# Patient Record
Sex: MALE | Race: BLACK OR AFRICAN AMERICAN | NOT HISPANIC OR LATINO | Employment: FULL TIME | URBAN - METROPOLITAN AREA
[De-identification: names, ages, dates, MRNs, and addresses within clinical notes are randomized per-mention and may not be internally consistent; named-entity substitution may affect disease eponyms.]

---

## 2020-01-11 ENCOUNTER — TELEPHONE (OUTPATIENT)
Dept: URGENT CARE | Facility: CLINIC | Age: 20
End: 2020-01-11

## 2022-01-09 PROCEDURE — U0004 COV-19 TEST NON-CDC HGH THRU: HCPCS | Performed by: NURSE PRACTITIONER

## 2025-06-11 ENCOUNTER — OFFICE VISIT (OUTPATIENT)
Dept: CARDIOLOGY | Facility: CLINIC | Age: 25
End: 2025-06-11
Payer: COMMERCIAL

## 2025-06-11 VITALS
SYSTOLIC BLOOD PRESSURE: 159 MMHG | WEIGHT: 315 LBS | OXYGEN SATURATION: 96 % | DIASTOLIC BLOOD PRESSURE: 78 MMHG | BODY MASS INDEX: 49.44 KG/M2 | HEIGHT: 67 IN | RESPIRATION RATE: 18 BRPM | HEART RATE: 86 BPM

## 2025-06-11 DIAGNOSIS — R00.2 PALPITATIONS: Primary | ICD-10-CM

## 2025-06-11 RX ORDER — AMLODIPINE BESYLATE 5 MG/1
1 TABLET ORAL DAILY
COMMUNITY
Start: 2025-05-29

## 2025-06-11 NOTE — PROGRESS NOTES
Cardiology Office Consultation      Encounter Date:  2025    PATIENT IDENTIFICATION    Name: Jose E Wilson  Age: 24 y.o. Sex: male : 2000  MRN: [id number]    Reason For Consultation:  Palpitations.    History of Present Illness:  Patient is a 24 y.o.  male  coming to cardiology office to establish care.    Patient has medical history of morbid obesity BMI 52, essential hypertension, family history of cardiomyopathy.    Today patient complains of episodes of exertional dyspnea and mild palpitations, otherwise denies chest pain, dizziness, lightheadedness, presyncope or syncope.  Patient reports he gained significantly amount of weight over the past 5 to 7 years.  He still tries to get an active lifestyle.  He is interested in losing weight and wants to have his heart checked.  Patient also states when he was a baby he had a murmur which resolved spontaneously without need for surgery. Patient states his father  at 24 years old from heart attack.    Denies smoking, alcohol abuse, illicit drug use.     Current medications include amlodipine 5 mg daily.    Prior cardiology workup. -Patient states she had echocardiogram as a baby, no records available at this time.  EKG performed in the office today shows normal sinus rhythm, left anterior fascicular block, low voltage precordial leads.    Assessment & Plan    Impressions:  Exertional dyspnea   Palpitations  Morbid obesity BMI 52  Essential hypertension, poorly controlled  Abnormal EKG, left anterior fascicular block and low voltage precordial leads.  Cardiac murmur as a child  Family history of premature sudden death, father  at 24 years old.    Recommendations:  Patient blood pressure is elevated today, 159/78.  Patient states he checks blood pressure regularly at work and is usually in the 120s over 70s and denies ever being this high.  Recommended patient to monitor blood pressure closely at home and we will reassess in the next visit if we  "need to increase antihypertensive regimen.  Advised patient to engage in a low calorie diet, low-sodium diet, regular physical activity, pursue weight loss.  Check TTE given abnormal EKG and exertional dyspnea.  Close follow-up in 2 months.      There are no diagnoses linked to this encounter.     Objective:    Vitals:  Vitals:    06/11/25 0825   BP: 159/78   BP Location: Right arm   Patient Position: Sitting   Cuff Size: Large Adult   Pulse: 86   Resp: 18   SpO2: 96%   Weight: (!) 152 kg (335 lb)   Height: 170.2 cm (67\")     Body mass index is 52.47 kg/m².    Physical Exam:  General: Alert, cooperative, no distress, appears stated age  Lungs:  Clear to auscultation bilaterally, no wheezes, rhonchi or rales are noted  Chest wall: No tenderness  Heart::  Regular rate and rhythm, S1 and S2 normal, no murmur.  No rub or gallop  Abdomen: Soft, nontender, nondistended, bowel sounds active  Extremities: No cyanosis, clubbing, or edema  Pulses: 2+ and symmetric all extremities  Neuro/psych: No gross focal deficits      History of Present Illness      Allergies:  No Known Allergies    Medication Review:     Current Outpatient Medications:     amLODIPine (NORVASC) 5 MG tablet, Take 1 tablet by mouth Daily., Disp: , Rfl:     Family History:  Family History   Problem Relation Age of Onset    Heart attack Father        Past Medical History:  Past Medical History:   Diagnosis Date    ADHD        Past surgical History:  History reviewed. No pertinent surgical history.    Social History:  Social History     Socioeconomic History    Marital status: Single   Tobacco Use    Smoking status: Never    Smokeless tobacco: Never    Tobacco comments:     pt denies vaping   Vaping Use    Vaping status: Never Used   Substance and Sexual Activity    Alcohol use: Yes     Comment: occ    Drug use: Never       Review of Systems:  The following systems were reviewed as they relate to the cardiovascular system: Constitutional, Eyes, ENT, " "Cardiovascular, Respiratory, Gastrointestinal, Integumentary, Neurological, Psychiatric, Hematologic, Endocrine, Musculoskeletal, and Genitourinary. The pertinent cardiovascular findings are reported above with all other cardiovascular points within those systems being negative.    Diagnostic Study Review:     Current Electrocardiogram:    ECG 12 Lead    Date/Time: 6/11/2025 8:40 AM  Performed by: Nitin Angelo MD    Authorized by: Nitni Angelo MD  Comparison: not compared with previous ECG   Previous ECG: no previous ECG available  Rhythm: sinus rhythm  Rate: normal  Conduction: left anterior fascicular block  ST Segments: ST segments normal  T Waves: T waves normal  QRS axis: normal  Other: no other findings  Other findings: low voltage    Clinical impression: non-specific ECG          Laboratory Data:  No results found for: \"GLUCOSE\", \"BUN\", \"CREATININE\", \"EGFRIFNONA\", \"EGFRIFAFRI\", \"BCR\", \"K\", \"CO2\", \"CALCIUM\", \"PROTENTOTREF\", \"ALBUMIN\", \"LABIL2\", \"BILIRUBIN\", \"AST\", \"ALT\"  No results found for: \"GLUCOSE\", \"CALCIUM\", \"NA\", \"K\", \"CO2\", \"CL\", \"BUN\", \"CREATININE\", \"EGFRIFAFRI\", \"EGFRIFNONA\", \"BCR\", \"ANIONGAP\"  No results found for: \"WBC\", \"HGB\", \"HCT\", \"MCV\", \"PLT\"  No results found for: \"CHOL\", \"CHLPL\", \"TRIG\", \"HDL\", \"LDL\", \"LDLDIRECT\"  No results found for: \"HGBA1C\"  No results found for: \"INR\", \"PROTIME\"    Most Recent Echo:       Most Recent Stress Test:       Most Recent Cardiac Catheterization:   No results found for this or any previous visit.       NOTE: The following portions of the patient's note were reviewed, confirmed and/or updated this visit as appropriate: History of present illness/Interval history, physical examination, assessment & plan, allergies, current medications, past family history, past medical history, past social history, past surgical history and problem list.  "